# Patient Record
Sex: FEMALE | Race: WHITE | URBAN - METROPOLITAN AREA
[De-identification: names, ages, dates, MRNs, and addresses within clinical notes are randomized per-mention and may not be internally consistent; named-entity substitution may affect disease eponyms.]

---

## 2019-11-26 ENCOUNTER — NEW PATIENT (OUTPATIENT)
Dept: URBAN - METROPOLITAN AREA CLINIC 26 | Facility: CLINIC | Age: 74
End: 2019-11-26

## 2019-11-26 VITALS
WEIGHT: 144 LBS | SYSTOLIC BLOOD PRESSURE: 139 MMHG | BODY MASS INDEX: 24.59 KG/M2 | HEART RATE: 80 BPM | DIASTOLIC BLOOD PRESSURE: 73 MMHG | HEIGHT: 64 IN

## 2019-11-26 DIAGNOSIS — H02.836: ICD-10-CM

## 2019-11-26 DIAGNOSIS — H40.9: ICD-10-CM

## 2019-11-26 DIAGNOSIS — H02.833: ICD-10-CM

## 2019-11-26 DIAGNOSIS — H35.373: ICD-10-CM

## 2019-11-26 DIAGNOSIS — Z96.1: ICD-10-CM

## 2019-11-26 DIAGNOSIS — H04.123: ICD-10-CM

## 2019-11-26 DIAGNOSIS — Z94.7: ICD-10-CM

## 2019-11-26 DIAGNOSIS — H35.3112: ICD-10-CM

## 2019-11-26 PROCEDURE — 92235 FLUORESCEIN ANGRPH MLTIFRAME: CPT

## 2019-11-26 PROCEDURE — 92134 CPTRZ OPH DX IMG PST SGM RTA: CPT

## 2019-11-26 PROCEDURE — 92250 FUNDUS PHOTOGRAPHY W/I&R: CPT

## 2019-11-26 PROCEDURE — 99204 OFFICE O/P NEW MOD 45 MIN: CPT

## 2019-11-26 RX ORDER — PREDNISOLONE ACETATE 10 MG/ML: 1 SUSPENSION/ DROPS OPHTHALMIC ONCE A DAY

## 2019-11-26 ASSESSMENT — VISUAL ACUITY
OD_SC: 20/25+2
OS_SC: 20/30+2

## 2019-11-26 ASSESSMENT — TONOMETRY
OD_IOP_MMHG: 12
OS_IOP_MMHG: 12

## 2020-02-24 ENCOUNTER — UNSCHEDULED FOLLOW UP (OUTPATIENT)
Dept: URBAN - METROPOLITAN AREA CLINIC 26 | Facility: CLINIC | Age: 75
End: 2020-02-24

## 2020-02-24 DIAGNOSIS — H57.12: ICD-10-CM

## 2020-02-24 DIAGNOSIS — Z94.7: ICD-10-CM

## 2020-02-24 DIAGNOSIS — H40.9: ICD-10-CM

## 2020-02-24 DIAGNOSIS — H35.373: ICD-10-CM

## 2020-02-24 DIAGNOSIS — H35.3112: ICD-10-CM

## 2020-02-24 PROCEDURE — 92014 COMPRE OPH EXAM EST PT 1/>: CPT

## 2020-02-24 PROCEDURE — 92134 CPTRZ OPH DX IMG PST SGM RTA: CPT

## 2020-02-24 RX ORDER — TOBRAMYCIN 3 MG/ML: 1 SOLUTION/ DROPS OPHTHALMIC

## 2020-02-24 ASSESSMENT — VISUAL ACUITY: OS_SC: 20/30-1

## 2020-02-24 ASSESSMENT — TONOMETRY: OS_IOP_MMHG: 11

## 2023-06-20 PROBLEM — 408574004: Status: ACTIVE | Noted: 2023-06-20

## 2023-06-21 ENCOUNTER — WEB ENCOUNTER (OUTPATIENT)
Dept: URBAN - METROPOLITAN AREA CLINIC 9 | Facility: CLINIC | Age: 78
End: 2023-06-21

## 2023-06-21 ENCOUNTER — OFFICE VISIT (OUTPATIENT)
Dept: URBAN - METROPOLITAN AREA CLINIC 9 | Facility: CLINIC | Age: 78
End: 2023-06-21
Payer: MEDICARE

## 2023-06-21 ENCOUNTER — DASHBOARD ENCOUNTERS (OUTPATIENT)
Age: 78
End: 2023-06-21

## 2023-06-21 VITALS
HEIGHT: 64 IN | WEIGHT: 140 LBS | DIASTOLIC BLOOD PRESSURE: 72 MMHG | SYSTOLIC BLOOD PRESSURE: 118 MMHG | BODY MASS INDEX: 23.9 KG/M2

## 2023-06-21 DIAGNOSIS — K52.9 CHRONIC DIARRHEA: ICD-10-CM

## 2023-06-21 DIAGNOSIS — Z12.11 SCREENING FOR COLON CANCER: ICD-10-CM

## 2023-06-21 DIAGNOSIS — R10.30 LOWER ABDOMINAL PAIN: ICD-10-CM

## 2023-06-21 DIAGNOSIS — R93.89 ABNORMAL FINDING ON IMAGING: ICD-10-CM

## 2023-06-21 PROCEDURE — 99204 OFFICE O/P NEW MOD 45 MIN: CPT | Performed by: INTERNAL MEDICINE

## 2023-06-21 RX ORDER — DICYCLOMINE HYDROCHLORIDE 20 MG/1
TAKE ONE TABLET BY MOUTH FOUR TIMES A DAY AS NEEDED TABLET ORAL
Qty: 60 UNSPECIFIED | Refills: 0 | Status: ACTIVE | COMMUNITY

## 2023-06-21 RX ORDER — ESCITALOPRAM OXALATE 10 MG/1
TAKE ONE TABLET BY MOUTH ONE TIME DAILY TABLET ORAL
Qty: 90 UNSPECIFIED | Refills: 0 | Status: ACTIVE | COMMUNITY

## 2023-06-21 RX ORDER — PREDNISOLONE ACETATE 10 MG/ML
INSTILL ONE DROP IN THE RIGHT EYE EVERY MORNING SUSPENSION/ DROPS OPHTHALMIC
Qty: 15 UNSPECIFIED | Refills: 0 | Status: ACTIVE | COMMUNITY

## 2023-06-21 RX ORDER — SODIUM, POTASSIUM,MAG SULFATES 17.5-3.13G
177ML SOLUTION, RECONSTITUTED, ORAL ORAL
Qty: 1 | OUTPATIENT
Start: 2023-06-20 | End: 2023-06-22

## 2023-06-21 NOTE — HPI-TODAY'S VISIT:
77-year-old female comes in for abdominal pain.  We have labs on May 15 which showed normal CBC and a CAT scan on June 1 which showed multiple large bowel wall thickening most pronounced in the sigmoid with adjacent fat stranding suspicious for colitis also possible cystitis. Patient is referred for diarrhea. Patient is here to establish care.  She says she had diarrheal disease in 2018 and had a colonoscopy was diagnosed with lymphocytic colitis.  Sounds that she was first put on sulfa and then budesonide.  She only took the budesonide for a couple weeks and all her symptoms resolved.  Symptoms restarted a few months ago but they are different.  The diarrhea is more significant and she has about 5 loose stools a day and also she has abdominal cramping before the diarrhea which is different.  No new medications since this started no significant weight loss no hematochezia.  No family history of GI issues.  Check she tried the low FODMAPs diet with no avail

## 2023-06-27 ENCOUNTER — CLAIMS CREATED FROM THE CLAIM WINDOW (OUTPATIENT)
Dept: URBAN - METROPOLITAN AREA SURGERY CENTER 9 | Facility: SURGERY CENTER | Age: 78
End: 2023-06-27
Payer: MEDICARE

## 2023-06-27 ENCOUNTER — CLAIMS CREATED FROM THE CLAIM WINDOW (OUTPATIENT)
Dept: URBAN - METROPOLITAN AREA CLINIC 4 | Facility: CLINIC | Age: 78
End: 2023-06-27
Payer: MEDICARE

## 2023-06-27 DIAGNOSIS — K63.89 OTHER SPECIFIED DISEASES OF INTESTINE: ICD-10-CM

## 2023-06-27 DIAGNOSIS — R19.7 ACUTE DIARRHEA: ICD-10-CM

## 2023-06-27 DIAGNOSIS — K64.8 OTHER HEMORRHOIDS: ICD-10-CM

## 2023-06-27 DIAGNOSIS — K62.1 RECTAL POLYP: ICD-10-CM

## 2023-06-27 DIAGNOSIS — K57.30 DIVERTICULOSIS OF LARGE INTESTINE WITHOUT PERFORATION OR ABSCESS WITHOUT BLEEDING: ICD-10-CM

## 2023-06-27 DIAGNOSIS — K62.1 HYPERPLASTIC RECTAL POLYP: ICD-10-CM

## 2023-06-27 DIAGNOSIS — K63.5 POLYP OF ASCENDING COLON, UNSPECIFIED TYPE: ICD-10-CM

## 2023-06-27 DIAGNOSIS — R19.7 CHRONIC DIARRHEA: ICD-10-CM

## 2023-06-27 DIAGNOSIS — D12.2 ADENOMA OF ASCENDING COLON: ICD-10-CM

## 2023-06-27 PROCEDURE — 00811 ANES LWR INTST NDSC NOS: CPT | Performed by: NURSE ANESTHETIST, CERTIFIED REGISTERED

## 2023-06-27 PROCEDURE — 45385 COLONOSCOPY W/LESION REMOVAL: CPT | Performed by: CLINIC/CENTER

## 2023-06-27 PROCEDURE — 45380 COLONOSCOPY AND BIOPSY: CPT | Performed by: CLINIC/CENTER

## 2023-06-27 PROCEDURE — 88342 IMHCHEM/IMCYTCHM 1ST ANTB: CPT | Performed by: PATHOLOGY

## 2023-06-27 PROCEDURE — 88313 SPECIAL STAINS GROUP 2: CPT | Performed by: PATHOLOGY

## 2023-06-27 PROCEDURE — 45380 COLONOSCOPY AND BIOPSY: CPT | Performed by: INTERNAL MEDICINE

## 2023-06-27 PROCEDURE — 88305 TISSUE EXAM BY PATHOLOGIST: CPT | Performed by: PATHOLOGY

## 2023-06-27 PROCEDURE — 45385 COLONOSCOPY W/LESION REMOVAL: CPT | Performed by: INTERNAL MEDICINE

## 2023-06-27 RX ORDER — PREDNISOLONE ACETATE 10 MG/ML
INSTILL ONE DROP IN THE RIGHT EYE EVERY MORNING SUSPENSION/ DROPS OPHTHALMIC
Qty: 15 UNSPECIFIED | Refills: 0 | Status: ACTIVE | COMMUNITY

## 2023-06-27 RX ORDER — DICYCLOMINE HYDROCHLORIDE 20 MG/1
TAKE ONE TABLET BY MOUTH FOUR TIMES A DAY AS NEEDED TABLET ORAL
Qty: 60 UNSPECIFIED | Refills: 0 | Status: ACTIVE | COMMUNITY

## 2023-06-27 RX ORDER — ESCITALOPRAM OXALATE 10 MG/1
TAKE ONE TABLET BY MOUTH ONE TIME DAILY TABLET ORAL
Qty: 90 UNSPECIFIED | Refills: 0 | Status: ACTIVE | COMMUNITY

## 2023-06-30 PROBLEM — 724538004: Status: ACTIVE | Noted: 2023-06-30

## 2023-07-03 LAB
IMMUNOGLOBULIN A: 61
INTERPRETATION: (no result)
TISSUE TRANSGLUTAMINASE AB, IGA: <1
TISSUE TRANSGLUTAMINASE AB, IGG: <1

## 2023-07-05 ENCOUNTER — TELEPHONE ENCOUNTER (OUTPATIENT)
Dept: URBAN - METROPOLITAN AREA CLINIC 9 | Facility: CLINIC | Age: 78
End: 2023-07-05

## 2023-07-06 ENCOUNTER — WEB ENCOUNTER (OUTPATIENT)
Dept: URBAN - METROPOLITAN AREA CLINIC 9 | Facility: CLINIC | Age: 78
End: 2023-07-06

## 2023-07-06 RX ORDER — BUDESONIDE 3 MG/1
TAKE 9MG DAILY FOR 8 WEEKS, THEN 6MG DAILY FOR 2 WEEKS, THEN 3MG DAILY FOR 2 WEEKS THEN STOP CAPSULE ORAL AS DIRECTED
Qty: 210 | Refills: 0 | OUTPATIENT
Start: 2023-07-12

## 2023-07-12 ENCOUNTER — TELEPHONE ENCOUNTER (OUTPATIENT)
Dept: URBAN - METROPOLITAN AREA CLINIC 9 | Facility: CLINIC | Age: 78
End: 2023-07-12

## 2023-07-12 RX ORDER — BALSALAZIDE DISODIUM 750 MG/1
3 CAPSULES CAPSULE ORAL THREE TIMES A DAY
Qty: 63 CAPSULE | Refills: 0 | OUTPATIENT
Start: 2023-07-12 | End: 2023-07-19

## 2023-07-31 ENCOUNTER — OFFICE VISIT (OUTPATIENT)
Dept: URBAN - METROPOLITAN AREA CLINIC 9 | Facility: CLINIC | Age: 78
End: 2023-07-31

## 2023-07-31 NOTE — HPI-TODAY'S VISIT:
77-year-old female comes in for abdominal pain.  We have labs on May 15 which showed normal CBC and a CAT scan on June 1 which showed multiple large bowel wall thickening most pronounced in the sigmoid with adjacent fat stranding suspicious for colitis also possible cystitis. Patient is referred for diarrhea. Patient is here to establish care.  She says she had diarrheal disease in 2018 and had a colonoscopy was diagnosed with lymphocytic colitis.  Sounds that she was first put on sulfa and then budesonide.  She only took the budesonide for a couple weeks and all her symptoms resolved.  Symptoms restarted a few months ago but they are different.  The diarrhea is more significant and she has about 5 loose stools a day and also she has abdominal cramping before the diarrhea which is different.  No new medications since this started no significant weight loss no hematochezia.  No family history of GI issues.  Check she tried the low FODMAPs diet with no avail We last proceeded wtih colonoscopy with col bxs, celiac testing and beenfiber bid. Random colon bxs were normal, one ta and one hp. Celiac testing was neg

## 2023-11-15 ENCOUNTER — NEW PATIENT (OUTPATIENT)
Dept: URBAN - METROPOLITAN AREA CLINIC 26 | Facility: CLINIC | Age: 78
End: 2023-11-15

## 2023-11-15 VITALS
SYSTOLIC BLOOD PRESSURE: 134 MMHG | WEIGHT: 145 LBS | HEART RATE: 76 BPM | HEIGHT: 64 IN | BODY MASS INDEX: 24.75 KG/M2 | DIASTOLIC BLOOD PRESSURE: 74 MMHG

## 2023-11-15 DIAGNOSIS — H04.123: ICD-10-CM

## 2023-11-15 DIAGNOSIS — H57.12: ICD-10-CM

## 2023-11-15 DIAGNOSIS — H35.3112: ICD-10-CM

## 2023-11-15 DIAGNOSIS — H35.373: ICD-10-CM

## 2023-11-15 DIAGNOSIS — H40.9: ICD-10-CM

## 2023-11-15 PROCEDURE — 92134 CPTRZ OPH DX IMG PST SGM RTA: CPT

## 2023-11-15 PROCEDURE — 92004 COMPRE OPH EXAM NEW PT 1/>: CPT

## 2023-11-15 PROCEDURE — 92250 FUNDUS PHOTOGRAPHY W/I&R: CPT

## 2023-11-15 ASSESSMENT — VISUAL ACUITY
OS_SC: 20/30-1
OD_SC: 20/50+2
OD_PH: 20/40-2

## 2023-11-15 ASSESSMENT — TONOMETRY
OS_IOP_MMHG: 12
OD_IOP_MMHG: 14